# Patient Record
Sex: MALE | Race: WHITE | URBAN - METROPOLITAN AREA
[De-identification: names, ages, dates, MRNs, and addresses within clinical notes are randomized per-mention and may not be internally consistent; named-entity substitution may affect disease eponyms.]

---

## 2022-03-15 LAB
LDL-C, EXTERNAL: 107
PSA, EXTERNAL: 1

## 2022-09-21 LAB — CREATININE, EXTERNAL: 1.17

## 2023-01-04 ENCOUNTER — OFFICE VISIT (OUTPATIENT)
Dept: INTERNAL MEDICINE CLINIC | Age: 73
End: 2023-01-04
Payer: MEDICARE

## 2023-01-04 VITALS
RESPIRATION RATE: 16 BRPM | WEIGHT: 235 LBS | TEMPERATURE: 98 F | HEIGHT: 70 IN | OXYGEN SATURATION: 98 % | DIASTOLIC BLOOD PRESSURE: 66 MMHG | SYSTOLIC BLOOD PRESSURE: 112 MMHG | BODY MASS INDEX: 33.64 KG/M2 | HEART RATE: 67 BPM

## 2023-01-04 DIAGNOSIS — I10 ESSENTIAL HYPERTENSION: ICD-10-CM

## 2023-01-04 DIAGNOSIS — E78.5 HYPERLIPIDEMIA, UNSPECIFIED HYPERLIPIDEMIA TYPE: ICD-10-CM

## 2023-01-04 DIAGNOSIS — E66.9 OBESITY (BMI 30-39.9): ICD-10-CM

## 2023-01-04 DIAGNOSIS — F32.A DEPRESSION, UNSPECIFIED DEPRESSION TYPE: ICD-10-CM

## 2023-01-04 DIAGNOSIS — Z76.89 ENCOUNTER TO ESTABLISH CARE: Primary | ICD-10-CM

## 2023-01-04 PROCEDURE — G0463 HOSPITAL OUTPT CLINIC VISIT: HCPCS | Performed by: INTERNAL MEDICINE

## 2023-01-04 PROCEDURE — 3017F COLORECTAL CA SCREEN DOC REV: CPT | Performed by: INTERNAL MEDICINE

## 2023-01-04 PROCEDURE — 1101F PT FALLS ASSESS-DOCD LE1/YR: CPT | Performed by: INTERNAL MEDICINE

## 2023-01-04 PROCEDURE — G8510 SCR DEP NEG, NO PLAN REQD: HCPCS | Performed by: INTERNAL MEDICINE

## 2023-01-04 PROCEDURE — 99204 OFFICE O/P NEW MOD 45 MIN: CPT | Performed by: INTERNAL MEDICINE

## 2023-01-04 PROCEDURE — G8536 NO DOC ELDER MAL SCRN: HCPCS | Performed by: INTERNAL MEDICINE

## 2023-01-04 PROCEDURE — G8427 DOCREV CUR MEDS BY ELIG CLIN: HCPCS | Performed by: INTERNAL MEDICINE

## 2023-01-04 PROCEDURE — G8417 CALC BMI ABV UP PARAM F/U: HCPCS | Performed by: INTERNAL MEDICINE

## 2023-01-04 RX ORDER — ACETAMINOPHEN 325 MG/1
TABLET ORAL
COMMUNITY

## 2023-01-04 RX ORDER — MULTIVIT WITH MINERALS/HERBS
1 TABLET ORAL DAILY
COMMUNITY

## 2023-01-04 RX ORDER — CHOLECALCIFEROL (VITAMIN D3) 50 MCG
CAPSULE ORAL
COMMUNITY

## 2023-01-04 RX ORDER — ESCITALOPRAM OXALATE 20 MG/1
20 TABLET ORAL DAILY
COMMUNITY
Start: 2022-12-29

## 2023-01-04 RX ORDER — LOSARTAN POTASSIUM AND HYDROCHLOROTHIAZIDE 12.5; 5 MG/1; MG/1
1 TABLET ORAL DAILY
COMMUNITY
Start: 2022-11-25

## 2023-01-04 RX ORDER — ASPIRIN 325 MG
325 TABLET ORAL DAILY
COMMUNITY

## 2023-01-04 NOTE — PROGRESS NOTES
Sukh Bautista is a 81 y.o. male  Chief Complaint   Patient presents with    New Patient     Visit Vitals  /66 (BP 1 Location: Left upper arm, BP Patient Position: Sitting, BP Cuff Size: Adult)   Pulse 67   Temp 98 °F (36.7 °C) (Temporal)   Resp 16   Ht 5' 10\" (1.778 m)   Wt 235 lb (106.6 kg)   SpO2 98%   BMI 33.72 kg/m²          HPI  Mr. Sukh Bautista is a 81 y.o. male with history of HTN, Depression presents to clinic to establish care. Patient's PCP, NP. Jose Myers and she may have left practice or the physician-patient relationship was stopped. His blood pressure is controlled with current medication. Denies chest pain, shortness of breath, swelling, orthopnea. He is compliant with diet but not so much with exercise. He likes to loose weight eventually, discussed about low calorie diet and exercise and [facility] weight management program. He prefers to try weight loss on his own for now and will consider those measures in the future. He doesn't drink or smoke at this time. He also reports he has kidney stones and had to go to [facility] due to pain. Patient was referred to urology and has an appointment in this month. Social History     Socioeconomic History    Marital status:    Tobacco Use    Smoking status: Former     Types: Cigarettes     Start date: 1/1/2007     Passive exposure: Never    Smokeless tobacco: Never   Vaping Use    Vaping Use: Never used   Substance and Sexual Activity    Alcohol use: Not Currently    Drug use: Never      . Past Medical History:   Diagnosis Date    Depression     Hypertension         No Known Allergies       ROS  Review of Systems   All other systems reviewed and are negative. EXAM  Physical Exam  Vitals reviewed. Constitutional:       Appearance: Normal appearance. HENT:      Head: Normocephalic and atraumatic.       Right Ear: Tympanic membrane and ear canal normal.      Left Ear: Tympanic membrane and ear canal normal. Cardiovascular:      Rate and Rhythm: Normal rate and regular rhythm. Heart sounds: Normal heart sounds. No murmur heard. Pulmonary:      Effort: Pulmonary effort is normal. No respiratory distress. Breath sounds: Normal breath sounds. Abdominal:      Tenderness: There is no right CVA tenderness or left CVA tenderness. Musculoskeletal:      Right lower leg: No edema. Left lower leg: No edema. Neurological:      General: No focal deficit present. Mental Status: He is alert. Psychiatric:         Mood and Affect: Mood normal.       Health Maintenance Due   Topic Date Due    Hepatitis C Screening  Never done    Lipid Screen  Never done    Shingles Vaccine (1 of 2) Never done    Pneumococcal 65+ years (1 - PCV) Never done    Medicare Yearly Exam  01/04/2023       ASSESSMENT/PLAN    Diagnoses and all orders for this visit:    1. Encounter to establish care  Comments:  will obtain medical records from previous pcp     2. Essential hypertension  Comments:  encouraged plan for weight loss, DASH diet and exercise     3. Depression, unspecified depression type  Comments:  well controlled, no SI/HI  on med for over 10  years     4. Obesity (BMI 30-39. 9)  Comments:  low calorie diet and exercise   [facility] weight management program     5.  Hyperlipidemia, unspecified hyperlipidemia type    Labs reviewed from august,   CBC, CMP, lipid , PSA, all within gatito limit         John Canela MD

## 2023-01-04 NOTE — PROGRESS NOTES
Rm 6    Was patient of [facility] was let go due to incompatability  Hx of kidney stones   Upset with loida dill due to last kidney stone  Has recent labs in exam rm    Chief Complaint   Patient presents with    New Patient     1. Have you been to the ER, urgent care clinic since your last visit? Hospitalized since your last visit? No    2. Have you seen or consulted any other health care providers outside of the [address] [place] [address] since your last visit? Include any pap smears or colon screening.  No    Visit Vitals  /66 (BP 1 Location: Left upper arm, BP Patient Position: Sitting, BP Cuff Size: Adult)   Pulse 67   Temp 98 °F (36.7 °C) (Temporal)   Resp 16   Ht 5' 10\" (1.778 m)   Wt 235 lb (106.6 kg)   SpO2 98%   BMI 33.72 kg/m²

## 2023-01-23 RX ORDER — ESCITALOPRAM OXALATE 20 MG/1
20 TABLET ORAL DAILY
Qty: 90 TABLET | Refills: 0 | Status: SHIPPED | OUTPATIENT
Start: 2023-01-23

## 2023-01-23 RX ORDER — LOSARTAN POTASSIUM AND HYDROCHLOROTHIAZIDE 12.5; 5 MG/1; MG/1
1 TABLET ORAL DAILY
Qty: 90 TABLET | Refills: 0 | Status: SHIPPED | OUTPATIENT
Start: 2023-01-23 | End: 2023-04-23

## 2023-04-05 ENCOUNTER — OFFICE VISIT (OUTPATIENT)
Dept: INTERNAL MEDICINE CLINIC | Age: 73
End: 2023-04-05
Payer: MEDICARE

## 2023-04-05 PROCEDURE — 99213 OFFICE O/P EST LOW 20 MIN: CPT | Performed by: INTERNAL MEDICINE

## 2023-04-05 PROCEDURE — G0463 HOSPITAL OUTPT CLINIC VISIT: HCPCS | Performed by: INTERNAL MEDICINE

## 2023-04-05 PROCEDURE — 1101F PT FALLS ASSESS-DOCD LE1/YR: CPT | Performed by: INTERNAL MEDICINE

## 2023-04-05 PROCEDURE — G9717 DOC PT DX DEP/BP F/U NT REQ: HCPCS | Performed by: INTERNAL MEDICINE

## 2023-04-05 PROCEDURE — G8417 CALC BMI ABV UP PARAM F/U: HCPCS | Performed by: INTERNAL MEDICINE

## 2023-04-05 PROCEDURE — G8536 NO DOC ELDER MAL SCRN: HCPCS | Performed by: INTERNAL MEDICINE

## 2023-04-05 PROCEDURE — G8427 DOCREV CUR MEDS BY ELIG CLIN: HCPCS | Performed by: INTERNAL MEDICINE

## 2023-04-05 PROCEDURE — 3017F COLORECTAL CA SCREEN DOC REV: CPT | Performed by: INTERNAL MEDICINE

## 2023-04-05 NOTE — PROGRESS NOTES
Patient is here for a follow up appointment but not sure for the reason of visit. No chief complaint on file. Vitals:    04/05/23 0941   Temp: 98.2 °F (36.8 °C)   TempSrc: Temporal   Weight: 232 lb (105.2 kg)       Current Outpatient Medications on File Prior to Visit   Medication Sig Dispense Refill    escitalopram oxalate (LEXAPRO) 20 mg tablet Take 1 Tablet by mouth daily. 90 Tablet 0    losartan-hydroCHLOROthiazide (HYZAAR) 50-12.5 mg per tablet Take 1 Tablet by mouth daily for 90 days. 90 Tablet 0    folic acid/multivit-min/lutein (CENTRUM SILVER PO) Take  by mouth. omega 3-dha-epa-fish oil (Fish OiL) 100-160-1,000 mg cap Take  by mouth.      b complex vitamins tablet Take 1 Tablet by mouth daily. acetaminophen (TYLENOL) 325 mg tablet Take  by mouth every six (6) hours as needed for Pain. Takes one at night      aspirin (ASPIRIN) 325 mg tablet Take 1 Tablet by mouth daily. One at night       No current facility-administered medications on file prior to visit. Health Maintenance Due   Topic    Hepatitis C Screening     Shingles Vaccine (1 of 2)    Medicare Yearly Exam        1. \"Have you been to the ER, urgent care clinic since your last visit? Hospitalized since your last visit? \" No    2. \"Have you seen or consulted any other health care providers outside of the [address] [place] [address] since your last visit? \" No     3. For patients aged 39-70: Has the patient had a colonoscopy / FIT/ Cologuard? Yes - no Care Gap present      If the patient is female:    4. For patients aged 41-77: Has the patient had a mammogram within the past 2 years? NA - based on age or sex      11. For patients aged 21-65: Has the patient had a pap smear?  NA - based on age or sex

## 2023-04-05 NOTE — PROGRESS NOTES
Akshat Evans is a 81 y.o. male  Chief Complaint   Patient presents with    Follow-up     Visit Vitals  /68 (BP 1 Location: Left upper arm, BP Patient Position: Sitting, BP Cuff Size: Adult long)   Pulse 64   Temp 98.2 °F (36.8 °C) (Temporal)   Resp 20   Ht 5' 10\" (1.778 m)   Wt 232 lb (105.2 kg)   SpO2 94%   BMI 33.29 kg/m²        HPI  Mr.Roland Gavi Wheeler presents to clinic to follow up on chronic medical problems . Has no acute concerns on this visit. Exercising two times daily, has lost 2 lbs since January. HTN: well controlled, Denies side effect of medications, chest pain, shortness of breath or edema     Depression and anxiety:  well controlled    Past Medical History:   Diagnosis Date    Depression     Hypertension             ROS  Review of Systems   All other systems reviewed and are negative. EXAM  Physical Exam  Vitals reviewed. Constitutional:       General: He is not in acute distress. Appearance: Normal appearance. He is not toxic-appearing. HENT:      Head: Normocephalic and atraumatic. Nose: No rhinorrhea. Cardiovascular:      Rate and Rhythm: Normal rate and regular rhythm. Heart sounds: Normal heart sounds. No murmur heard. No gallop. Pulmonary:      Effort: Pulmonary effort is normal. No respiratory distress. Breath sounds: Normal breath sounds. No wheezing or rales. Abdominal:      Palpations: Abdomen is soft. Neurological:      Mental Status: He is alert and oriented to person, place, and time. Psychiatric:         Mood and Affect: Mood normal.     Health Maintenance Due   Topic Date Due    Hepatitis C Screening  Never done    Shingles Vaccine (1 of 2) Never done    Medicare Yearly Exam  Never done       ASSESSMENT/PLAN    Diagnoses and all orders for this visit:    1. Essential hypertension    2. Depression, unspecified depression type    3. Obesity (BMI 30-39.9)    4.  Hyperlipidemia, unspecified hyperlipidemia type          Ladi Adela Robb MD

## 2023-05-01 RX ORDER — LOSARTAN POTASSIUM AND HYDROCHLOROTHIAZIDE 12.5; 5 MG/1; MG/1
TABLET ORAL
Qty: 90 TABLET | Refills: 0 | Status: SHIPPED | OUTPATIENT
Start: 2023-05-01

## 2023-07-31 DIAGNOSIS — I10 ESSENTIAL (PRIMARY) HYPERTENSION: Primary | ICD-10-CM

## 2023-07-31 RX ORDER — LOSARTAN POTASSIUM AND HYDROCHLOROTHIAZIDE 12.5; 5 MG/1; MG/1
1 TABLET ORAL DAILY
Qty: 30 TABLET | Refills: 2 | Status: SHIPPED | OUTPATIENT
Start: 2023-07-31 | End: 2023-10-29

## 2023-09-13 ENCOUNTER — OFFICE VISIT (OUTPATIENT)
Age: 73
End: 2023-09-13
Payer: MEDICARE

## 2023-09-13 VITALS
WEIGHT: 240 LBS | OXYGEN SATURATION: 95 % | HEART RATE: 65 BPM | HEIGHT: 70 IN | BODY MASS INDEX: 34.36 KG/M2 | RESPIRATION RATE: 17 BRPM

## 2023-09-13 DIAGNOSIS — E78.5 HYPERLIPIDEMIA, UNSPECIFIED HYPERLIPIDEMIA TYPE: ICD-10-CM

## 2023-09-13 DIAGNOSIS — L82.1 KERATOSIS, SEBORRHEIC: ICD-10-CM

## 2023-09-13 DIAGNOSIS — Z12.5 SCREENING FOR PROSTATE CANCER: ICD-10-CM

## 2023-09-13 DIAGNOSIS — E66.9 OBESITY (BMI 30-39.9): ICD-10-CM

## 2023-09-13 DIAGNOSIS — I10 ESSENTIAL (PRIMARY) HYPERTENSION: ICD-10-CM

## 2023-09-13 DIAGNOSIS — Z00.00 MEDICARE ANNUAL WELLNESS VISIT, SUBSEQUENT: Primary | ICD-10-CM

## 2023-09-13 DIAGNOSIS — D17.0 LIPOMA OF FACE: ICD-10-CM

## 2023-09-13 LAB
ALBUMIN SERPL-MCNC: 4.1 G/DL (ref 3.5–5)
ALBUMIN/GLOB SERPL: 1.2 (ref 1.1–2.2)
ALP SERPL-CCNC: 66 U/L (ref 45–117)
ALT SERPL-CCNC: 44 U/L (ref 12–78)
ANION GAP SERPL CALC-SCNC: 4 MMOL/L (ref 5–15)
AST SERPL-CCNC: 18 U/L (ref 15–37)
BILIRUB SERPL-MCNC: 0.6 MG/DL (ref 0.2–1)
BUN SERPL-MCNC: 18 MG/DL (ref 6–20)
BUN/CREAT SERPL: 15 (ref 12–20)
CALCIUM SERPL-MCNC: 9.6 MG/DL (ref 8.5–10.1)
CHLORIDE SERPL-SCNC: 103 MMOL/L (ref 97–108)
CHOLEST SERPL-MCNC: 201 MG/DL
CO2 SERPL-SCNC: 31 MMOL/L (ref 21–32)
CREAT SERPL-MCNC: 1.17 MG/DL (ref 0.7–1.3)
GLOBULIN SER CALC-MCNC: 3.3 G/DL (ref 2–4)
GLUCOSE SERPL-MCNC: 99 MG/DL (ref 65–100)
HDLC SERPL-MCNC: 60 MG/DL
HDLC SERPL: 3.4 (ref 0–5)
LDLC SERPL CALC-MCNC: 111.6 MG/DL (ref 0–100)
POTASSIUM SERPL-SCNC: 4.4 MMOL/L (ref 3.5–5.1)
PROT SERPL-MCNC: 7.4 G/DL (ref 6.4–8.2)
PSA SERPL-MCNC: 1.3 NG/ML (ref 0.01–4)
SODIUM SERPL-SCNC: 138 MMOL/L (ref 136–145)
TRIGL SERPL-MCNC: 147 MG/DL
VLDLC SERPL CALC-MCNC: 29.4 MG/DL

## 2023-09-13 PROCEDURE — 3017F COLORECTAL CA SCREEN DOC REV: CPT | Performed by: INTERNAL MEDICINE

## 2023-09-13 PROCEDURE — G0439 PPPS, SUBSEQ VISIT: HCPCS | Performed by: INTERNAL MEDICINE

## 2023-09-13 PROCEDURE — 1123F ACP DISCUSS/DSCN MKR DOCD: CPT | Performed by: INTERNAL MEDICINE

## 2023-09-13 SDOH — ECONOMIC STABILITY: INCOME INSECURITY: HOW HARD IS IT FOR YOU TO PAY FOR THE VERY BASICS LIKE FOOD, HOUSING, MEDICAL CARE, AND HEATING?: NOT HARD AT ALL

## 2023-09-13 SDOH — ECONOMIC STABILITY: FOOD INSECURITY: WITHIN THE PAST 12 MONTHS, THE FOOD YOU BOUGHT JUST DIDN'T LAST AND YOU DIDN'T HAVE MONEY TO GET MORE.: NEVER TRUE

## 2023-09-13 SDOH — ECONOMIC STABILITY: HOUSING INSECURITY
IN THE LAST 12 MONTHS, WAS THERE A TIME WHEN YOU DID NOT HAVE A STEADY PLACE TO SLEEP OR SLEPT IN A SHELTER (INCLUDING NOW)?: NO

## 2023-09-13 SDOH — ECONOMIC STABILITY: FOOD INSECURITY: WITHIN THE PAST 12 MONTHS, YOU WORRIED THAT YOUR FOOD WOULD RUN OUT BEFORE YOU GOT MONEY TO BUY MORE.: NEVER TRUE

## 2023-09-13 ASSESSMENT — PATIENT HEALTH QUESTIONNAIRE - PHQ9
SUM OF ALL RESPONSES TO PHQ QUESTIONS 1-9: 0
SUM OF ALL RESPONSES TO PHQ QUESTIONS 1-9: 0
9. THOUGHTS THAT YOU WOULD BE BETTER OFF DEAD, OR OF HURTING YOURSELF: 0
10. IF YOU CHECKED OFF ANY PROBLEMS, HOW DIFFICULT HAVE THESE PROBLEMS MADE IT FOR YOU TO DO YOUR WORK, TAKE CARE OF THINGS AT HOME, OR GET ALONG WITH OTHER PEOPLE: 0
SUM OF ALL RESPONSES TO PHQ9 QUESTIONS 1 & 2: 0
8. MOVING OR SPEAKING SO SLOWLY THAT OTHER PEOPLE COULD HAVE NOTICED. OR THE OPPOSITE, BEING SO FIGETY OR RESTLESS THAT YOU HAVE BEEN MOVING AROUND A LOT MORE THAN USUAL: 0
6. FEELING BAD ABOUT YOURSELF - OR THAT YOU ARE A FAILURE OR HAVE LET YOURSELF OR YOUR FAMILY DOWN: 0
4. FEELING TIRED OR HAVING LITTLE ENERGY: 0
1. LITTLE INTEREST OR PLEASURE IN DOING THINGS: 0
3. TROUBLE FALLING OR STAYING ASLEEP: 0
SUM OF ALL RESPONSES TO PHQ QUESTIONS 1-9: 0
7. TROUBLE CONCENTRATING ON THINGS, SUCH AS READING THE NEWSPAPER OR WATCHING TELEVISION: 0
2. FEELING DOWN, DEPRESSED OR HOPELESS: 0
5. POOR APPETITE OR OVEREATING: 0
SUM OF ALL RESPONSES TO PHQ QUESTIONS 1-9: 0

## 2023-09-13 NOTE — PROGRESS NOTES
Patient here today for medicare wellness visit  Reports two acute concerns  One mole to upper middle back. Reports family hx of skin cancer  Also noticed lump to left side of face that showed up approx.  two week ago   No

## 2023-09-13 NOTE — PROGRESS NOTES
lMedicare Annual Wellness Visit    Karolina Gregg is here for Medicare AWV    Assessment & Plan   Medicare annual wellness visit, subsequent  Hyperlipidemia, unspecified hyperlipidemia type  -     Comprehensive Metabolic Panel; Future  Essential (primary) hypertension  -     Comprehensive Metabolic Panel; Future  -     Lipid Panel; Future  Screening for prostate cancer  -     PSA Screening; Future  Lipoma of face  -     Amb External Referral To Dermatology  Keratosis, seborrheic  -     Amb External Referral To Dermatology  Obesity (BMI 30-39. 9)  Recommendations for Preventive Services Due: see orders and patient instructions/AVS.  Recommended screening schedule for the next 5-10 years is provided to the patient in written form: see Patient Instructions/AVS.     Return in about 6 months (around 3/13/2024). Subjective   The following acute and/or chronic problems were also addressed today:    Rodrigue Ulloa is a 22-year-old male with a history of hypertension and treated depression presents to the clinic for wellness exam.  He has multiples subright keratosis on his back and wants me to check his skin. He also has a tender hard subcutaneous nodule of about the 1-2 cm in diameter. Noticed a tender nodule about 2 weeks ago. He does not recall any bites or injury. He would like to have it removed or biopsied. His blood pressure is in good control and his mood is good. He is not eating as well as he should and tries to exercise when the weather allows. patient's complete Health Risk Assessment and screening values have been reviewed and are found in Flowsheets. The following problems were reviewed today and where indicated follow up appointments were made and/or referrals ordered.     Positive Risk Factor Screenings with Interventions:                 Weight and Activity:  Physical Activity: Insufficiently Active (9/13/2023)    Exercise Vital Sign     Days of Exercise per Week: 7 days     Minutes of

## 2023-09-13 NOTE — PATIENT INSTRUCTIONS
Learning About Vision Tests  What are vision tests? The four most common vision tests are visual acuity tests, refraction, visual field tests, and color vision tests. Visual acuity (sharpness) tests  These tests are used: To see if you need glasses or contact lenses. To monitor an eye problem. To check an eye injury. Visual acuity tests are done as part of routine exams. You may also have this test when you get your 's license or apply for some types of jobs. Visual field tests  These tests are used: To check for vision loss in any area of your range of vision. To screen for certain eye diseases. To look for nerve damage after a stroke, head injury, or other problem that could reduce blood flow to the brain. Refraction and color tests  A refraction test is done to find the right prescription for glasses and contact lenses. A color vision test is done to check for color blindness. Color vision is often tested as part of a routine exam. You may also have this test when you apply for a job where recognizing different colors is important, such as , electronics, or the [employer]. How are vision tests done? Visual acuity test   You cover one eye at a time. You read aloud from a wall chart across the room. You read aloud from a small card that you hold in your hand. Refraction   You look into a special device. The device puts lenses of different strengths in front of each eye to see how strong your glasses or contact lenses need to be. Visual field tests   Your doctor may have you look through special machines. Or your doctor may simply have you stare straight ahead while they move a finger into and out of your field of vision. Color vision test   You look at pieces of printed test patterns in various colors. You say what number or symbol you see. Your doctor may have you trace the number or symbol using a pointer. How do these tests feel?   There is very little chance of

## 2023-10-26 DIAGNOSIS — I10 ESSENTIAL (PRIMARY) HYPERTENSION: ICD-10-CM

## 2023-10-26 RX ORDER — LOSARTAN POTASSIUM AND HYDROCHLOROTHIAZIDE 12.5; 5 MG/1; MG/1
1 TABLET ORAL DAILY
Qty: 30 TABLET | Refills: 2 | Status: SHIPPED | OUTPATIENT
Start: 2023-10-26 | End: 2024-01-24

## 2023-10-26 NOTE — TELEPHONE ENCOUNTER
Refill request received from [facility] for   Requested Prescriptions     Pending Prescriptions Disp Refills    losartan-hydroCHLOROthiazide (HYZAAR) 50-12.5 MG per tablet [Pharmacy Med Name: LOSARTAN/HCTZ 50/12.5MG TABLETS] 30 tablet 2     Sig: TAKE 1 TABLET BY MOUTH DAILY     9/13/2023   Visit date not found     Routed to Dr Ta Heard for review.

## 2024-01-23 DIAGNOSIS — I10 ESSENTIAL (PRIMARY) HYPERTENSION: ICD-10-CM

## 2024-01-23 RX ORDER — ESCITALOPRAM OXALATE 20 MG/1
20 TABLET ORAL DAILY
Qty: 30 TABLET | Refills: 0 | Status: SHIPPED | OUTPATIENT
Start: 2024-01-23

## 2024-01-23 RX ORDER — LOSARTAN POTASSIUM AND HYDROCHLOROTHIAZIDE 12.5; 5 MG/1; MG/1
1 TABLET ORAL DAILY
Qty: 30 TABLET | Refills: 2 | Status: SHIPPED | OUTPATIENT
Start: 2024-01-23 | End: 2024-04-22

## 2024-01-23 NOTE — TELEPHONE ENCOUNTER
Refill request received from [facility] for   Requested Prescriptions     Pending Prescriptions Disp Refills    losartan-hydroCHLOROthiazide (HYZAAR) 50-12.5 MG per tablet 30 tablet 2     Sig: Take 1 tablet by mouth daily    escitalopram (LEXAPRO) 20 MG tablet 30 tablet 0     Sig: Take 1 tablet by mouth daily     Last office visit: 9/13/2023   Next office visit: Visit date not found     Routed to Dr Adriana Willis for review.

## 2024-01-23 NOTE — TELEPHONE ENCOUNTER
Pt is requesting a refill for the following medications        losartan-hydroCHLOROthiazide (HYZAAR) 50-12.5 MG per tablet     escitalopram (LEXAPRO) 20 MG tablet

## 2024-07-22 DIAGNOSIS — I10 ESSENTIAL (PRIMARY) HYPERTENSION: ICD-10-CM

## 2024-07-23 RX ORDER — LOSARTAN POTASSIUM AND HYDROCHLOROTHIAZIDE 12.5; 5 MG/1; MG/1
1 TABLET ORAL DAILY
Qty: 90 TABLET | Refills: 0 | Status: SHIPPED | OUTPATIENT
Start: 2024-07-23

## 2024-08-13 ENCOUNTER — TELEPHONE (OUTPATIENT)
Dept: OTHER | Facility: CLINIC | Age: 74
End: 2024-08-13

## 2024-11-12 DIAGNOSIS — I10 ESSENTIAL (PRIMARY) HYPERTENSION: ICD-10-CM

## 2024-11-13 RX ORDER — LOSARTAN POTASSIUM AND HYDROCHLOROTHIAZIDE 12.5; 5 MG/1; MG/1
1 TABLET ORAL DAILY
Qty: 90 TABLET | Refills: 0 | Status: SHIPPED | OUTPATIENT
Start: 2024-11-13

## 2025-02-07 DIAGNOSIS — I10 ESSENTIAL (PRIMARY) HYPERTENSION: ICD-10-CM

## 2025-02-07 RX ORDER — LOSARTAN POTASSIUM AND HYDROCHLOROTHIAZIDE 12.5; 5 MG/1; MG/1
1 TABLET ORAL DAILY
Qty: 90 TABLET | Refills: 0 | Status: SHIPPED | OUTPATIENT
Start: 2025-02-07

## 2025-05-09 DIAGNOSIS — I10 ESSENTIAL (PRIMARY) HYPERTENSION: ICD-10-CM

## 2025-05-09 RX ORDER — LOSARTAN POTASSIUM AND HYDROCHLOROTHIAZIDE 12.5; 5 MG/1; MG/1
TABLET ORAL
Qty: 90 TABLET | Refills: 0 | OUTPATIENT
Start: 2025-05-09

## 2025-05-12 DIAGNOSIS — I10 ESSENTIAL (PRIMARY) HYPERTENSION: ICD-10-CM

## 2025-05-12 RX ORDER — LOSARTAN POTASSIUM AND HYDROCHLOROTHIAZIDE 12.5; 5 MG/1; MG/1
TABLET ORAL
Qty: 90 TABLET | Refills: 0 | OUTPATIENT
Start: 2025-05-12